# Patient Record
Sex: MALE | Race: WHITE
[De-identification: names, ages, dates, MRNs, and addresses within clinical notes are randomized per-mention and may not be internally consistent; named-entity substitution may affect disease eponyms.]

---

## 2020-11-06 ENCOUNTER — HOSPITAL ENCOUNTER (EMERGENCY)
Dept: HOSPITAL 56 - MW.ED | Age: 40
Discharge: LEFT BEFORE DISCHARGE | End: 2020-11-06
Payer: SELF-PAY

## 2020-11-06 DIAGNOSIS — U07.1: ICD-10-CM

## 2020-11-06 DIAGNOSIS — R79.89: ICD-10-CM

## 2020-11-06 DIAGNOSIS — F17.210: ICD-10-CM

## 2020-11-06 DIAGNOSIS — R55: Primary | ICD-10-CM

## 2020-11-06 LAB
BUN SERPL-MCNC: 17 MG/DL (ref 7–18)
CHLORIDE SERPL-SCNC: 104 MMOL/L (ref 98–107)
CO2 SERPL-SCNC: 31.3 MMOL/L (ref 21–32)
GLUCOSE SERPL-MCNC: 86 MG/DL (ref 74–106)
POTASSIUM SERPL-SCNC: 4.1 MMOL/L (ref 3.5–5.1)
SODIUM SERPL-SCNC: 140 MMOL/L (ref 136–148)

## 2020-11-06 PROCEDURE — 93005 ELECTROCARDIOGRAM TRACING: CPT

## 2020-11-06 PROCEDURE — 82962 GLUCOSE BLOOD TEST: CPT

## 2020-11-06 PROCEDURE — 36415 COLL VENOUS BLD VENIPUNCTURE: CPT

## 2020-11-06 PROCEDURE — U0002 COVID-19 LAB TEST NON-CDC: HCPCS

## 2020-11-06 PROCEDURE — 71275 CT ANGIOGRAPHY CHEST: CPT

## 2020-11-06 PROCEDURE — 80053 COMPREHEN METABOLIC PANEL: CPT

## 2020-11-06 PROCEDURE — 87635 SARS-COV-2 COVID-19 AMP PRB: CPT

## 2020-11-06 PROCEDURE — 71045 X-RAY EXAM CHEST 1 VIEW: CPT

## 2020-11-06 PROCEDURE — 85025 COMPLETE CBC W/AUTO DIFF WBC: CPT

## 2020-11-06 PROCEDURE — 99284 EMERGENCY DEPT VISIT MOD MDM: CPT

## 2020-11-06 PROCEDURE — 84484 ASSAY OF TROPONIN QUANT: CPT
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
## 2020-11-06 NOTE — CR
INDICATION:



Syncope



COMPARISON:



None



TECHNIQUE:



Single-view chest radiograph



FINDINGS:



TUBES AND LINES: None.



HEART AND MEDIASTINUM: The heart size is normal. The mediastinal contour 

appears normal for patient age.



LUNGS AND PLEURAL SPACES: The lungs appear normal.The pleural spaces are 

unremarkable.



OSSEOUS STRUCTURES: Age-appropriate appearance. No acute focal finding.



IMPRESSION:



No evidence of active pulmonary disease.



Dictated by Madhu Forbes MD @ Nov 6 2020  8:53AM



Signed by Dr. Madhu Forbes @ Nov 6 2020  8:53AM

## 2020-11-06 NOTE — CT
INDICATION:



Syncope and elevated troponin 



COMPARISON:



None 



TECHNIQUE: :



CT examination of the chest was performed with the uneventful intravenous 

administration of 100 cc of Isovue 370 while thin axial sections were 

obtained from above the apices of the lungs to the lung bases. 



Please note that all CT scans at this facility use dose modulation, 

iterative reconstruction, and/or weight-based dosing when appropriate to 

reduce radiation dose to as low as reasonably achievable. 



FINDINGS: :



HEART and MEDIASTINUM: The heart size is normal. There is no mediastinal or 

hilar adenopathy or mass. There is no pericardial effusion.



PULMONARY ARTERIAL CIRCULATION: Somewhat limited by motion and bolus timing 

factor but there is no suggestive pulmonary embolus on this exam.



LUNGS: Ground-glass opacities primarily in a dependent distribution. This 

pattern is more favorable for atelectasis rather than viral pneumonia. 

However, viral pneumonia should be considered if appropriate clinically. No 

focal consolidation. 



PLEURAL SPACES: There is no pleural effusion, pneumothorax or pleural based 

mass. 



VISUALIZED UPPER ABDOMEN: The limited visualized upper abdominal structures 

appear normal. 



OSSEOUS STRUCTURES: Age-appropriate appearance. No acute fracture or 

destructive process.



TUBES and LINES: None.



IMPRESSION:



1. There are some technical limitations of this study but I see no finding 

to suggest pulmonary embolus. 



2. Ground-glass opacities primarily in a dependent distribution. This 

purely dependent distribution is more favorable for atelectasis rather than 

viral pneumonia. However, viral pneumonia should be considered if 

appropriate clinically. No focal consolidation. Normal pleural spaces



Please note that all CT scans at this facility use dose modulation, 

iterative reconstruction, and/or weight-based dosing when appropriate to 

reduce radiation dose to as low as reasonably achievable.



Dictated by Madhu Forbes MD @ Nov 6 2020 10:26AM



Signed by Dr. Madhu Forbes @ Nov 6 2020 10:34AM